# Patient Record
Sex: FEMALE | Race: WHITE | Employment: OTHER | URBAN - NONMETROPOLITAN AREA
[De-identification: names, ages, dates, MRNs, and addresses within clinical notes are randomized per-mention and may not be internally consistent; named-entity substitution may affect disease eponyms.]

---

## 2018-03-04 ENCOUNTER — APPOINTMENT (OUTPATIENT)
Dept: GENERAL RADIOLOGY | Age: 53
End: 2018-03-04
Payer: MEDICAID

## 2018-03-04 ENCOUNTER — HOSPITAL ENCOUNTER (EMERGENCY)
Age: 53
Discharge: HOME | End: 2018-03-05
Attending: EMERGENCY MEDICINE
Payer: MEDICAID

## 2018-03-04 DIAGNOSIS — J44.9 CHRONIC OBSTRUCTIVE PULMONARY DISEASE, UNSPECIFIED COPD TYPE (HCC): ICD-10-CM

## 2018-03-04 DIAGNOSIS — R06.89 DYSPNEA AND RESPIRATORY ABNORMALITIES: Primary | ICD-10-CM

## 2018-03-04 DIAGNOSIS — R06.00 DYSPNEA AND RESPIRATORY ABNORMALITIES: Primary | ICD-10-CM

## 2018-03-04 LAB
ALBUMIN SERPL-MCNC: 3.7 G/DL (ref 3.5–5.2)
ALP BLD-CCNC: 105 U/L (ref 35–104)
ALT SERPL-CCNC: 45 U/L (ref 5–33)
ANION GAP SERPL CALCULATED.3IONS-SCNC: 11 MMOL/L (ref 7–19)
AST SERPL-CCNC: 25 U/L (ref 5–32)
BASOPHILS ABSOLUTE: 0.1 K/UL (ref 0–0.2)
BASOPHILS RELATIVE PERCENT: 0.3 % (ref 0–1)
BILIRUB SERPL-MCNC: 0.7 MG/DL (ref 0.2–1.2)
BUN BLDV-MCNC: 19 MG/DL (ref 6–20)
CALCIUM SERPL-MCNC: 9.3 MG/DL (ref 8.6–10)
CHLORIDE BLD-SCNC: 95 MMOL/L (ref 98–111)
CO2: 33 MMOL/L (ref 22–29)
CREAT SERPL-MCNC: 0.8 MG/DL (ref 0.5–0.9)
EOSINOPHILS ABSOLUTE: 0.2 K/UL (ref 0–0.6)
EOSINOPHILS RELATIVE PERCENT: 1.3 % (ref 0–5)
GFR NON-AFRICAN AMERICAN: >60
GLUCOSE BLD-MCNC: 160 MG/DL (ref 74–109)
HCT VFR BLD CALC: 44.6 % (ref 37–47)
HEMOGLOBIN: 13.3 G/DL (ref 12–16)
LYMPHOCYTES ABSOLUTE: 1.9 K/UL (ref 1.1–4.5)
LYMPHOCYTES RELATIVE PERCENT: 13.4 % (ref 20–40)
MCH RBC QN AUTO: 27.7 PG (ref 27–31)
MCHC RBC AUTO-ENTMCNC: 29.8 G/DL (ref 33–37)
MCV RBC AUTO: 92.7 FL (ref 81–99)
MONOCYTES ABSOLUTE: 0.9 K/UL (ref 0–0.9)
MONOCYTES RELATIVE PERCENT: 6.3 % (ref 0–10)
NEUTROPHILS ABSOLUTE: 11.2 K/UL (ref 1.5–7.5)
NEUTROPHILS RELATIVE PERCENT: 78.1 % (ref 50–65)
PDW BLD-RTO: 15.8 % (ref 11.5–14.5)
PERFORMED ON: NORMAL
PLATELET # BLD: 155 K/UL (ref 130–400)
PMV BLD AUTO: 9.9 FL (ref 9.4–12.3)
POC TROPONIN I: 0.01 NG/ML (ref 0–0.08)
POTASSIUM SERPL-SCNC: 4.1 MMOL/L (ref 3.5–5)
RBC # BLD: 4.81 M/UL (ref 4.2–5.4)
SODIUM BLD-SCNC: 139 MMOL/L (ref 136–145)
TOTAL PROTEIN: 6.8 G/DL (ref 6.6–8.7)
WBC # BLD: 14.4 K/UL (ref 4.8–10.8)

## 2018-03-04 PROCEDURE — 71046 X-RAY EXAM CHEST 2 VIEWS: CPT

## 2018-03-04 PROCEDURE — 93005 ELECTROCARDIOGRAM TRACING: CPT

## 2018-03-04 PROCEDURE — 99285 EMERGENCY DEPT VISIT HI MDM: CPT

## 2018-03-04 RX ORDER — BENAZEPRIL HYDROCHLORIDE 20 MG/1
20 TABLET ORAL DAILY
COMMUNITY

## 2018-03-04 RX ORDER — TORSEMIDE 10 MG/1
10 TABLET ORAL EVERY MORNING
COMMUNITY

## 2018-03-04 RX ORDER — RANITIDINE 150 MG/1
150 TABLET ORAL 2 TIMES DAILY
COMMUNITY

## 2018-03-04 RX ORDER — FLUCONAZOLE 100 MG/1
100 TABLET ORAL DAILY
COMMUNITY

## 2018-03-04 RX ORDER — LEVOTHYROXINE SODIUM 0.05 MG/1
50 TABLET ORAL DAILY
COMMUNITY

## 2018-03-04 RX ORDER — POTASSIUM CHLORIDE 750 MG/1
10 CAPSULE, EXTENDED RELEASE ORAL 2 TIMES DAILY
COMMUNITY

## 2018-03-05 VITALS
HEART RATE: 88 BPM | WEIGHT: 255 LBS | SYSTOLIC BLOOD PRESSURE: 118 MMHG | BODY MASS INDEX: 40.98 KG/M2 | RESPIRATION RATE: 20 BRPM | DIASTOLIC BLOOD PRESSURE: 78 MMHG | HEIGHT: 66 IN | TEMPERATURE: 98 F | OXYGEN SATURATION: 97 %

## 2018-03-05 LAB
PRO-BNP: 21 PG/ML (ref 0–900)
RAPID INFLUENZA  B AGN: NEGATIVE
RAPID INFLUENZA A AGN: NEGATIVE

## 2018-03-05 PROCEDURE — 6370000000 HC RX 637 (ALT 250 FOR IP): Performed by: EMERGENCY MEDICINE

## 2018-03-05 PROCEDURE — 80053 COMPREHEN METABOLIC PANEL: CPT

## 2018-03-05 PROCEDURE — 94640 AIRWAY INHALATION TREATMENT: CPT

## 2018-03-05 PROCEDURE — 85025 COMPLETE CBC W/AUTO DIFF WBC: CPT

## 2018-03-05 PROCEDURE — 83880 ASSAY OF NATRIURETIC PEPTIDE: CPT

## 2018-03-05 PROCEDURE — 36415 COLL VENOUS BLD VENIPUNCTURE: CPT

## 2018-03-05 PROCEDURE — 84484 ASSAY OF TROPONIN QUANT: CPT

## 2018-03-05 PROCEDURE — 87804 INFLUENZA ASSAY W/OPTIC: CPT

## 2018-03-05 PROCEDURE — 99284 EMERGENCY DEPT VISIT MOD MDM: CPT | Performed by: EMERGENCY MEDICINE

## 2018-03-05 RX ORDER — IPRATROPIUM BROMIDE AND ALBUTEROL SULFATE 2.5; .5 MG/3ML; MG/3ML
1 SOLUTION RESPIRATORY (INHALATION) ONCE
Status: COMPLETED | OUTPATIENT
Start: 2018-03-05 | End: 2018-03-05

## 2018-03-05 RX ADMIN — IPRATROPIUM BROMIDE AND ALBUTEROL SULFATE 1 AMPULE: .5; 3 SOLUTION RESPIRATORY (INHALATION) at 02:18

## 2018-03-05 ASSESSMENT — ENCOUNTER SYMPTOMS
EYE DISCHARGE: 0
SHORTNESS OF BREATH: 1
CONSTIPATION: 1
FACIAL SWELLING: 0
WHEEZING: 0
APNEA: 0
COUGH: 0
CHOKING: 0
DIARRHEA: 0
EYES NEGATIVE: 1
NAUSEA: 0
CONSTIPATION: 0
ABDOMINAL PAIN: 0
BLOOD IN STOOL: 0
VOICE CHANGE: 0
VOMITING: 0
COUGH: 1
SORE THROAT: 0
SINUS PRESSURE: 0

## 2018-03-05 NOTE — ED PROVIDER NOTES
[facility]  eMERGENCY dEPARTMENT eNCOUnter      Pt Name: Marcia Alamo  MRN: [id number]  [place] 1965  Date of evaluation: 3/4/2018  Provider: Miguelangel Jerry MD    [address] [place] [address]       Chief Complaint   Patient presents with    Fatigue     Arrived per [facility] with generalized weakness and low grade fever     Shortness of Breath         HISTORY OF PRESENT ILLNESS   (Location/Symptom, Timing/Onset, Context/Setting, Quality, Duration, Modifying Factors, Severity)  Note limiting factors. Marcia Alamo is a 47 y.o. female who presents to the emergency department Feeling weird and shortness of breath past 2 days. 20-year-old female [place] recently hospitalized in [address] [facility] for pneumonia in the middle of February she just completed her prednisone. She is out of town she brought her oxygen concentrator with her and they went to a hotel for the weekend here in [place]. She started feeling short of breath and weird and funny feelings yesterday worse today. She is here for evaluation. She also starting her on some low-grade TM. She had completed her antibiotics and she had some thrush after that. She also has a wound on her right ankle. She seen wound management and she is doing dressing changes herself. The history is provided by the patient. Nursing Notes were reviewed. REVIEW OF SYSTEMS    (2-9 systems for level 4, 10 or more for level 5)     Review of Systems   Constitutional: Positive for fever. HENT: Negative for drooling, facial swelling, nosebleeds, sinus pressure, sore throat and voice change. Eyes: Negative for discharge. Respiratory: Positive for cough and shortness of breath. Negative for apnea and choking. Cardiovascular: Negative for chest pain and leg swelling. Gastrointestinal: Negative for abdominal pain, blood in stool, constipation, diarrhea and nausea. Genitourinary: Negative for dysuria and enuresis. Musculoskeletal: Negative for joint swelling. Skin: Negative for rash and wound. Neurological: Negative for seizures and syncope. Complained of feeling weird from inside out. Psychiatric/Behavioral: Negative for behavioral problems, hallucinations and suicidal ideas. All other systems reviewed and are negative. A complete review of systems was performed and is negative except as noted above in the HPI. PAST MEDICAL HISTORY     Past Medical History:   Diagnosis Date    [facility]F (congestive [facility] failure) ([facility]     COPD (chronic obstructive pulmonary disease) ([facility]     Thyroid disease          SURGICAL HISTORY       Past Surgical History:   Procedure Laterality Date    KNEE ARTHROSCOPY Bilateral     TONSILLECTOMY AND ADENOIDECTOMY      TUBAL LIGATION      at age 33         CURRENT MEDICATIONS       Previous Medications    BENAZEPRIL (LOTENSIN) 20 MG TABLET    Take 20 mg by mouth daily    FLUCONAZOLE (DIFLUCAN) 100 MG TABLET    Take 100 mg by mouth daily    LEVOTHYROXINE (SYNTHROID) 50 MCG TABLET    Take 50 mcg by mouth Daily    METHADONE HCL DISKETS PO    Take 100 mg by mouth    POTASSIUM CHLORIDE (MICRO-K) 10 MEQ EXTENDED RELEASE CAPSULE    Take 10 mEq by mouth 2 times daily    RANITIDINE (ZANTAC) 150 MG TABLET    Take 150 mg by mouth 2 times daily    TORSEMIDE (DEMADEX) 10 MG TABLET    Take 10 mg by mouth every morning       ALLERGIES     Bactrim [sulfamethoxazole-trimethoprim]; Codeine; and Rocephin [ceftriaxone]    FAMILY HISTORY     History reviewed. No pertinent family history. SOCIAL HISTORY       Social History     Social History    Marital status:       Spouse name: N/A    Number of children: N/A    Years of education: N/A     Social History Main Topics    Smoking status: Former Smoker    Smokeless tobacco: Former User    Alcohol use No    Drug use: No    Sexual activity: No     Other Topics Concern    None     Social History Narrative    None

## 2018-03-05 NOTE — ED TRIAGE NOTES
Patient states that she has weakness and that she was in [facility] in [facility] for pneumonia

## 2018-03-06 LAB
EKG P AXIS: 74 DEGREES
EKG P-R INTERVAL: 154 MS
EKG Q-T INTERVAL: 352 MS
EKG QRS DURATION: 94 MS
EKG QTC CALCULATION (BAZETT): 425 MS
EKG T AXIS: 69 DEGREES